# Patient Record
Sex: MALE | Race: WHITE | URBAN - METROPOLITAN AREA
[De-identification: names, ages, dates, MRNs, and addresses within clinical notes are randomized per-mention and may not be internally consistent; named-entity substitution may affect disease eponyms.]

---

## 2024-08-31 ENCOUNTER — HOSPITAL ENCOUNTER (OUTPATIENT)
Dept: RADIOLOGY | Facility: EXTERNAL LOCATION | Age: 59
Discharge: HOME | End: 2024-08-31
Payer: COMMERCIAL

## 2024-08-31 DIAGNOSIS — R52 PAIN: ICD-10-CM

## 2024-09-05 ENCOUNTER — APPOINTMENT (OUTPATIENT)
Dept: ORTHOPEDIC SURGERY | Facility: CLINIC | Age: 59
End: 2024-09-05
Payer: COMMERCIAL

## 2024-09-05 ENCOUNTER — OFFICE VISIT (OUTPATIENT)
Dept: ORTHOPEDIC SURGERY | Facility: CLINIC | Age: 59
End: 2024-09-05
Payer: COMMERCIAL

## 2024-09-05 DIAGNOSIS — S92.014A NONDISPLACED FRACTURE OF BODY OF RIGHT CALCANEUS, INITIAL ENCOUNTER FOR CLOSED FRACTURE: Primary | ICD-10-CM

## 2024-09-05 PROCEDURE — 99204 OFFICE O/P NEW MOD 45 MIN: CPT | Performed by: FAMILY MEDICINE

## 2024-09-05 ASSESSMENT — PAIN - FUNCTIONAL ASSESSMENT: PAIN_FUNCTIONAL_ASSESSMENT: 0-10

## 2024-09-05 ASSESSMENT — PAIN SCALES - GENERAL: PAINLEVEL_OUTOF10: 1

## 2024-09-05 ASSESSMENT — PAIN DESCRIPTION - DESCRIPTORS: DESCRIPTORS: SHARP;SHOOTING

## 2024-09-05 NOTE — PROGRESS NOTES
New patient right foot fracture    Subjective   Henrik Lind is a 58 y.o. male who presents for Pain of the Right Heel    9/5/2024  Patient presents with right heel pain after fall off a ladder 1 week and 1 day ago.  He was approximately 6 feet off the ground and landed directly on both of his heels.  Primarily landing with his right heel first.  He had significant pain and was having a difficult time ambulating and went into urgent care on Saturday.  They noted that he had a fractured calcaneus, recommended orthopedic follow-up and placed in a fracture boot with crutches and instructions for nonweightbearing.  Patient has been using ibuprofen 3 times a day with adequate pain control.  He is adamant that he does not want any surgical options and is interested in nonsurgical options today.  He denies any numbness tingling across the foot.  Denies any pain into the ankle, knee, hip or lower back.        ROS: All pertinent positive symptoms are included in the history of present illness.    All other systems have been reviewed and are negative and noncontributory to this patient's current ailments.    Objective     There were no vitals filed for this visit.    Physical Exam  Musculoskeletal:      Right foot: Tenderness present.       Foot/Ankle Musculoskeletal Exam    Inspection    Right      Edema: mild        Ecchymosis comment: Significant ecchymosis on the plantar aspect as well as the dorsum of the right foot with extension beneath both malleoli      Inspection additional comments: No fracture blistering or any skin breakdown noted.    Palpation    Right      Tenderness: present        Tenderness comment: Point tenderness on the calcaneus consistent with patient's fracture.    Range of Motion    Range of motion additional comments: Does have passive and active planta/ dorsiflexion though causes pain in the calcaneus.    Neurovascular    Right      Right foot/ankle neurovascular exam is normal.      Left       Left foot/ankle neurovascular exam is normal.         Imaging:                       I have personally reviewed and agree with Radiologist interpretation of previous imaging studies performed prior to this visit. I have included further imaging and interpretation as discussed below.   Radiographs: Patient has a nondisplaced fracture of the body of his right calcaneus    === 08/31/24 ===  [facility] URG[facility] CARE XRAY  - Impression -  Comminuted nondisplaced fracture of the base of the calcaneus.  Slight contour irregularity of the 4th digit proximal phalanx shaft  possibly related to old trauma. Correlate with tenderness in this  region to better exclude acute injury.  Signed by: Rancho Bates 8/31/2024 10:02 AM    Assessment/Plan   Problem List Items Addressed This Visit       Nondisplaced fracture of body of right calcaneus, initial encounter for closed fracture - Primary       Patient is a 58-year-old who suffered a fall from a ladder and originally went to an urgent care with right heel pain.  He presents for follow-up of his right calcaneal fracture.  History exam and imaging consistent with a calcaneal fracture.  Discussed at length risk benefits alternatives to nonoperative versus operative management as well as the need for further advanced imaging including CT imaging of the right foot/heel to determine appropriate management.  Adamant on nonsurgical options and was educated on risks of not completing CT imaging for considering surgical management/follow-up with a foot surgeon including worsening osteoarthritis, poor functional mobility, and other long-term functional deficits.  Will have patient casted and continue nonweightbearing for at least 1 month, will have him follow-up in 2 weeks for repeat x-ray imaging and cast replacement.  Encouraged him to continue over-the-counter ibuprofen for continued management of his pain.    Marin Birmingham, DO  [facility]

## 2024-09-05 NOTE — LETTER
September 10, 2024     Patient: Henrik Lind   YOB: 1965   Date of Visit: 9/5/2024       To Whom It May Concern:    It is my medical opinion that Henrik Lind should remain out of work until 11/1/24 . Henrik has a calcaneous fracture and is nonweightbearing at this time until examination shows otherwise.    If you have any questions or concerns, please don't hesitate to call.         Sincerely,        Bo Randolph MD    CC: No Recipients

## 2024-09-06 ENCOUNTER — APPOINTMENT (OUTPATIENT)
Dept: ORTHOPEDIC SURGERY | Facility: CLINIC | Age: 59
End: 2024-09-06

## 2024-09-06 ENCOUNTER — APPOINTMENT (OUTPATIENT)
Dept: ORTHOPEDIC SURGERY | Facility: CLINIC | Age: 59
End: 2024-09-06
Payer: COMMERCIAL

## 2024-09-18 ENCOUNTER — HOSPITAL ENCOUNTER (OUTPATIENT)
Dept: RADIOLOGY | Facility: CLINIC | Age: 59
Discharge: HOME | End: 2024-09-18
Payer: COMMERCIAL

## 2024-09-18 ENCOUNTER — OFFICE VISIT (OUTPATIENT)
Dept: ORTHOPEDIC SURGERY | Facility: CLINIC | Age: 59
End: 2024-09-18
Payer: COMMERCIAL

## 2024-09-18 DIAGNOSIS — S92.014A NONDISPLACED FRACTURE OF BODY OF RIGHT CALCANEUS, INITIAL ENCOUNTER FOR CLOSED FRACTURE: ICD-10-CM

## 2024-09-18 PROCEDURE — 73630 X-RAY EXAM OF FOOT: CPT | Mod: RT

## 2024-09-18 PROCEDURE — 99213 OFFICE O/P EST LOW 20 MIN: CPT | Performed by: PHYSICIAN ASSISTANT

## 2024-09-18 PROCEDURE — 73630 X-RAY EXAM OF FOOT: CPT | Mod: RIGHT SIDE | Performed by: RADIOLOGY

## 2024-09-18 NOTE — PROGRESS NOTES
New patient right foot fracture    Subjective   Henrik Lind is a 58 y.o. male who presents for Pain of the Right Foot (Refer by HAYDEN Randolph)    9/5/2024  Patient presents with right heel pain after fall off a ladder 1 week and 1 day ago.  He was approximately 6 feet off the ground and landed directly on both of his heels.  Primarily landing with his right heel first.  He had significant pain and was having a difficult time ambulating and went into urgent care on Saturday.  They noted that he had a fractured calcaneus, recommended orthopedic follow-up and placed in a fracture boot with crutches and instructions for nonweightbearing.  Patient has been using ibuprofen 3 times a day with adequate pain control.  He is adamant that he does not want any surgical options and is interested in nonsurgical options today.  He denies any numbness tingling across the foot.  Denies any pain into the ankle, knee, hip or lower back.      On 9/18/24; patient presents for fuv right heel fracture. He reports he is doing well. Minimal to no pain. NWB on cast. Getting calcium and vit D.     ROS: All pertinent positive symptoms are included in the history of present illness.    All other systems have been reviewed and are negative and noncontributory to this patient's current ailments.    Objective     There were no vitals filed for this visit.    Physical Exam  Musculoskeletal:      Right foot: Tenderness present.       Foot/Ankle Musculoskeletal Exam    Inspection    Right      Edema: mild        Ecchymosis comment: Significant ecchymosis on the plantar aspect as well as the dorsum of the right foot with extension beneath both malleoli      Inspection additional comments: No fracture blistering or any skin breakdown noted.    Palpation    Right      Tenderness: present        Tenderness comment: Point tenderness on the calcaneus consistent with patient's fracture.    Range of Motion    Range of motion additional comments: Does  have passive and active planta/ dorsiflexion though causes pain in the calcaneus.    Neurovascular    Right      Right foot/ankle neurovascular exam is normal.      Left      Left foot/ankle neurovascular exam is normal.       I personally reviewed the patient's x-ray images and reports of the right foot. The xrays show calcaneal fracture unchanged in alignment or appearance.  No other fractures or dislocation.  Normal joint spacing      Assessment/Plan   Problem List Items Addressed This Visit       Nondisplaced fracture of body of right calcaneus, initial encounter for closed fracture    Relevant Orders    XR foot right 3+ views    Disability Placard       ASSESSMENT: right nondisplaced calcaneal fracture    PLAN: I discussed with the patient the differential diagnosis, complex overuse and degenerative related nature of the condition(s) and available treatment option(s). Patient had cast removed. Patient was placed in a tall CAM walker boot to be NWB with crutches.  They were given boot instructions. Patient will increase their dietary calcium intake (milk,yogurt) and should get 1200 mg of calcium per day. They will also take a vitamin D supplement. All the patient's questions were answered. The patient agrees with the above plan.  Follow up in 3 weeks with repeat right foot xrays with AP, lateral and oblique views to evaluate bone healing.

## 2024-09-18 NOTE — PATIENT INSTRUCTIONS
YOUR BOOT INSTRUCTIONS:  You CANNOT put weight on your foot and ankle while in the boot.    You can use crutches or walker for support   You should wear boot when walking or standing   You can take off your boot while bathing, sitting, stretching, icing or doing therapy exercises.  You can take your boot off at nighttime while sleeping.    You can also use OTC Voltaren gel or  aspercreme ointment and apply it to the injured area.      Ice and elevate supported at the calf to reduce swelling    Patient will increase their dietary calcium intake (milk,yogurt) and should get 1200 mg of calcium per day. They will also take a vitamin D supplement.    You can use a bucket of room temperature water with Epson salt and do your ankle/toe exercises    Follow up in 3 weeks

## 2024-10-10 ENCOUNTER — HOSPITAL ENCOUNTER (OUTPATIENT)
Dept: RADIOLOGY | Facility: CLINIC | Age: 59
Discharge: HOME | End: 2024-10-10
Payer: COMMERCIAL

## 2024-10-10 ENCOUNTER — OFFICE VISIT (OUTPATIENT)
Dept: ORTHOPEDIC SURGERY | Facility: CLINIC | Age: 59
End: 2024-10-10
Payer: COMMERCIAL

## 2024-10-10 DIAGNOSIS — S92.014A NONDISPLACED FRACTURE OF BODY OF RIGHT CALCANEUS, INITIAL ENCOUNTER FOR CLOSED FRACTURE: ICD-10-CM

## 2024-10-10 PROCEDURE — 99214 OFFICE O/P EST MOD 30 MIN: CPT | Performed by: PHYSICIAN ASSISTANT

## 2024-10-10 PROCEDURE — 73630 X-RAY EXAM OF FOOT: CPT | Mod: RT

## 2024-10-10 NOTE — PATIENT INSTRUCTIONS
YOUR BOOT INSTRUCTIONS:  You can put weight on your foot and ankle while in the boot.    You can use crutches or walker for support as needed.   You should wear boot when walking or standing   You can take off your boot while bathing, sitting, stretching, icing or doing therapy exercises.  You can take your boot off at nighttime while sleeping.    BOOT WEANING:  DAY 1-- come out of boot for 1 hour (wear supportive athletic shoes and orthotic inserts), rest of the day in boot  DAY 2-- come out of boot for 2 hours (wear supportive athletic shoes and orthotic inserts), rest of the day in boot  DAY 3-- come out of boot for 3 hours (wear supportive athletic shoes and orthotic inserts), rest of the day in boot  DAY 4-- come out of boot for 4 hours (wear supportive athletic shoes and orthotic inserts), rest of the day in boot  DAY 5-- come out of boot and wear supportive shoes and orthotic inserts  * if you have pain while weaning out of boot then go back one day in the protocol (i.e. If really sore on the morning of Day 3 from coming out of boot for 2 hours the previous day, go back to Day 1 and start again through the protocol)    You can also use OTC Voltaren gel or  aspercreme ointment and apply it to the injured area.      Ice and elevate supported at the calf to reduce swelling    The stretching program (see handout) should be done for about 10 minutes, three times a day.  You should stretch for 3 times a day for 6 weeks and then daily afterwards to maintain your flexibility.    [ ] If you received a plantar fascial night splint, then it should be worn for 6 weeks nightly and as needed after that period of time.  This is to minimize your stiffness and pain with the first few steps in the morning.    For additional relief, here are some more suggestions.  Lotions such as aspercreme that are available over the counter are helpful to rub in areas of heel pain up to three times daily.  You can also freeze a water bottle  and roll the bottom of your foot over it.  Also, a heel cushion or heel cup that is available at local pharmacies or sports stores can be put in your shoe for extra cushioning.       Patient will increase their dietary calcium intake (milk,yogurt) and should get 1200 mg of calcium per day. They will also take a vitamin D supplement.    The patient was given a prescription for physical therapy.  Physical therapy is medically necessary to improve strength, balance, range of motion and functional outcomes after injury and/or surgery.    Follow up in 3 weeks

## 2024-10-10 NOTE — PROGRESS NOTES
Subjective   Henrik Lind is a 58 y.o. male who presents for Follow-up of the Right Foot    .w9/5/2024  Patient presents with right heel pain after fall off a ladder 1 week and 1 day ago.  He was approximately 6 feet off the ground and landed directly on both of his heels.  Primarily landing with his right heel first.  He had significant pain and was having a difficult time ambulating and went into urgent care on Saturday.  They noted that he had a fractured calcaneus, recommended orthopedic follow-up and placed in a fracture boot with crutches and instructions for nonweightbearing.  Patient has been using ibuprofen 3 times a day with adequate pain control.  He is adamant that he does not want any surgical options and is interested in nonsurgical options today.  He denies any numbness tingling across the foot.  Denies any pain into the ankle, knee, hip or lower back.      On 9/18/24; patient presents for fuv right heel fracture. He reports he is doing well. Minimal to no pain. NWB on cast. Getting calcium and vit D.     On 10/10/24; patient presents today for fuv right heel fracture. He states he is doing well. He had been NWB on the boot but started wearing an athletic shoe and putting weight on the foot this week. He has some tightness in the arch but no pain.     ROS: All pertinent positive symptoms are included in the history of present illness.    All other systems have been reviewed and are negative and noncontributory to this patient's current ailments.    On examination of the right ankle/foot:  Normal gait with crutches  Normal alignment  Minimal  swelling; No ecchymosis or erythema. Skin intact; no ulcers or lesions.   Normal ROM in  ankle plantarflexion, dorsiflexion, inversion and eversion; normal ROM in 2-5th toe flexion/extension and 1st MTP flexion/extension  Normal strength in ankle plantarflexion, dorsiflexion, inversion and eversion; normal strength with great toe  flexion/extension  Tenderness to palpation: none over calcaneus  No tenderness to palpation over the Achilles, medial and lateral malleolus, posterior tibial tendon, peroneal tendon, ATFL, deltoid ligament, talus or navicular.  no tenderness to palpation over heel, plantar arch, base of 1st metatarsal/2nd metatarsal, sesamoids, 5th metatarsal, medial cuneiform, navicular, 1st MTP joint or 2nd or 3rd intermetarsal space.  Neurovascularly intact. Good capillary refill. No sensory deficit to light touch. Normal proprioception. Dorsalis pedis and posterior tibial pulses 2+ bilaterally.    - Martin's test                              - Dorsiflexion-eversion test  - Anterior Drawer test                      - resisted dorsiflexion-eversion test  - Talar tilt test                                    - shuck testing  - Squeeze test             I personally reviewed the patient's x-ray images and reports of the right foot. The xrays show calcaneal fracture with interval callus formation.  No other fractures or dislocation.  Normal joint spacing      Assessment/Plan   Problem List Items Addressed This Visit       Nondisplaced fracture of body of right calcaneus, initial encounter for closed fracture    Relevant Orders    XR foot right 3+ views    Referral to Physical Therapy       ASSESSMENT: right nondisplaced calcaneal fracture; healing    PLAN: I discussed with the patient the differential diagnosis, complex overuse and degenerative related nature of the condition(s) and available treatment option(s). The patient was given a prescription for physical therapy.  Physical therapy is medically necessary to improve strength, balance, range of motion and functional outcomes after injury and/or surgery.  Patient was placed in a tall CAM walker boot to be gradual WB with crutches.  They were given boot instructions. Patient will increase their dietary calcium intake (milk,yogurt) and should get 1200 mg of calcium per day. They  will also take a vitamin D supplement. All the patient's questions were answered. The patient agrees with the above plan.  Follow up in 3 weeks with repeat right foot xrays with AP, lateral and oblique views to evaluate bone healing and for return to work

## 2024-10-31 ENCOUNTER — HOSPITAL ENCOUNTER (OUTPATIENT)
Dept: RADIOLOGY | Facility: CLINIC | Age: 59
Discharge: HOME | End: 2024-10-31
Payer: COMMERCIAL

## 2024-10-31 ENCOUNTER — OFFICE VISIT (OUTPATIENT)
Dept: ORTHOPEDIC SURGERY | Facility: CLINIC | Age: 59
End: 2024-10-31
Payer: COMMERCIAL

## 2024-10-31 DIAGNOSIS — S92.014A NONDISPLACED FRACTURE OF BODY OF RIGHT CALCANEUS, INITIAL ENCOUNTER FOR CLOSED FRACTURE: ICD-10-CM

## 2024-10-31 PROCEDURE — 99214 OFFICE O/P EST MOD 30 MIN: CPT | Performed by: PHYSICIAN ASSISTANT

## 2024-10-31 PROCEDURE — 73630 X-RAY EXAM OF FOOT: CPT | Mod: RT

## 2024-10-31 NOTE — LETTER
October 31, 2024     Patient: Henrik Lind   YOB: 1965   Date of Visit: 10/31/2024       To Whom It May Concern:    It is my medical opinion that Henrik Lind may return to work on 11/9/24 .    If you have any questions or concerns, please don't hesitate to call.         Sincerely,        Festus Longoria PA-C    CC: No Recipients

## 2024-10-31 NOTE — PATIENT INSTRUCTIONS
1. Follow stretching exercises that were on a separate handout   2. Hold each stretch for a least 1 minute  3. Do not bounce while stretching  4. Stretch for 10 minutes at a time, 3x a day for 6 weeks then daily  5. Remember, it takes several weeks to a few months of consistent stretching to increase flexibility and decrease symptoms.     You can use OTC Voltaren gel or aspercream and apply it to the injured area.    Ice and elevate supported at the calf with no pressure on the heel to reduce swelling.    Patient will increase their dietary calcium intake (milk,yogurt) and should get 1200 mg of calcium per day. They will also take a vitamin D supplement.    To help support your foot, you can purchase inserts over the counter. You want to look for full length inserts with a foam arch (not flat gel ones). Brands such as [employer] or others work well. Full length inserts give a little more support than the short ones. But, if you use a full length insert, you often will have to remove the insole that came with the shoe and then put the insert you buy then in the shoe    Follow up as needed

## 2024-11-06 NOTE — PROGRESS NOTES
Subjective   Henrik Lind is a 59 y.o. male who presents for Follow-up of the Right Foot    .w9/5/2024  Patient presents with right heel pain after fall off a ladder 1 week and 1 day ago.  He was approximately 6 feet off the ground and landed directly on both of his heels.  Primarily landing with his right heel first.  He had significant pain and was having a difficult time ambulating and went into urgent care on Saturday.  They noted that he had a fractured calcaneus, recommended orthopedic follow-up and placed in a fracture boot with crutches and instructions for nonweightbearing.  Patient has been using ibuprofen 3 times a day with adequate pain control.  He is adamant that he does not want any surgical options and is interested in nonsurgical options today.  He denies any numbness tingling across the foot.  Denies any pain into the ankle, knee, hip or lower back.      On 9/18/24; patient presents for fuv right heel fracture. He reports he is doing well. Minimal to no pain. NWB on cast. Getting calcium and vit D.     On 10/10/24; patient presents today for fuv right heel fracture. He states he is doing well. He had been NWB on the boot but started wearing an athletic shoe and putting weight on the foot this week. He has some tightness in the arch but no pain.     On 10/31/24; patient reports he is doing well. No pain. WBAT in tennis shoes. Getting calcium and vit D. Doing HEP.     ROS: All pertinent positive symptoms are included in the history of present illness.    All other systems have been reviewed and are negative and noncontributory to this patient's current ailments.    On examination of the right ankle/foot:  Normal gait   Normal alignment  Minimal  swelling; No ecchymosis or erythema. Skin intact; no ulcers or lesions.   Normal ROM in  ankle plantarflexion, dorsiflexion, inversion and eversion; normal ROM in 2-5th toe flexion/extension and 1st MTP flexion/extension  Normal strength in ankle  plantarflexion, dorsiflexion, inversion and eversion; normal strength with great toe flexion/extension  Tenderness to palpation: none over calcaneus  No tenderness to palpation over the Achilles, medial and lateral malleolus, posterior tibial tendon, peroneal tendon, ATFL, deltoid ligament, talus or navicular.  no tenderness to palpation over heel, plantar arch, base of 1st metatarsal/2nd metatarsal, sesamoids, 5th metatarsal, medial cuneiform, navicular, 1st MTP joint or 2nd or 3rd intermetarsal space.  Neurovascularly intact. Good capillary refill. No sensory deficit to light touch. Normal proprioception. Dorsalis pedis and posterior tibial pulses 2+ bilaterally.    - Martin's test                              - Dorsiflexion-eversion test  - Anterior Drawer test                      - resisted dorsiflexion-eversion test  - Talar tilt test                                    - shuck testing  - Squeeze test             I personally reviewed the patient's x-ray images and reports of the right foot. The xrays show calcaneal fracture with increased callus formation.  No other fractures or dislocation.  Normal joint spacing      Assessment/Plan   Problem List Items Addressed This Visit       Nondisplaced fracture of body of right calcaneus, initial encounter for closed fracture    Relevant Orders    XR foot right 3+ views (Completed)       ASSESSMENT: right nondisplaced calcaneal fracture; healing    PLAN: I discussed with the patient the differential diagnosis, complex overuse and degenerative related nature of the condition(s) and available treatment option(s). The patient was given a prescription for physical therapy.  Physical therapy is medically necessary to improve strength, balance, range of motion and functional outcomes after injury and/or surgery.  Patient is ambulatory and was given a prescription for orthotics, which are medically necessary due to the patient's medical condition and symptomatic posterior  tibial tendon dysfunction.  . Patient will increase their dietary calcium intake (milk,yogurt) and should get 1200 mg of calcium per day. They will also take a vitamin D supplement. All the patient's questions were answered. The patient agrees with the above plan.  Follow up in 4 weeks with repeat right foot xrays with AP, lateral and oblique views to evaluate bone healing and for return to work